# Patient Record
Sex: MALE | Race: WHITE
[De-identification: names, ages, dates, MRNs, and addresses within clinical notes are randomized per-mention and may not be internally consistent; named-entity substitution may affect disease eponyms.]

---

## 2021-04-11 ENCOUNTER — HOSPITAL ENCOUNTER (EMERGENCY)
Dept: HOSPITAL 96 - M.ERS | Age: 21
Discharge: HOME | End: 2021-04-11
Payer: COMMERCIAL

## 2021-04-11 VITALS — SYSTOLIC BLOOD PRESSURE: 136 MMHG | DIASTOLIC BLOOD PRESSURE: 82 MMHG

## 2021-04-11 VITALS — HEIGHT: 73 IN | WEIGHT: 180.01 LBS | BODY MASS INDEX: 23.86 KG/M2

## 2021-04-11 DIAGNOSIS — F12.90: Primary | ICD-10-CM

## 2021-04-11 DIAGNOSIS — R11.2: ICD-10-CM

## 2021-04-11 LAB
ABSOLUTE BASOPHILS: 0 THOU/UL (ref 0–0.2)
ABSOLUTE EOSINOPHILS: 0.1 THOU/UL (ref 0–0.7)
ABSOLUTE MONOCYTES: 0.9 THOU/UL (ref 0–1.2)
ALBUMIN SERPL-MCNC: 4.4 G/DL (ref 3.4–5)
ALP SERPL-CCNC: 66 U/L (ref 46–116)
ALT SERPL-CCNC: 21 U/L (ref 30–65)
ANION GAP SERPL CALC-SCNC: 15 MMOL/L (ref 7–16)
AST SERPL-CCNC: 15 U/L (ref 15–37)
BASOPHILS NFR BLD AUTO: 0.4 %
BILIRUB SERPL-MCNC: 0.3 MG/DL
BILIRUB UR-MCNC: NEGATIVE MG/DL
BUN SERPL-MCNC: 16 MG/DL (ref 7–18)
CALCIUM SERPL-MCNC: 9.5 MG/DL (ref 8.5–10.1)
CHLORIDE SERPL-SCNC: 104 MMOL/L (ref 98–107)
CO2 SERPL-SCNC: 22 MMOL/L (ref 21–32)
COLOR UR: YELLOW
CREAT SERPL-MCNC: 1.2 MG/DL (ref 0.6–1.3)
EOSINOPHIL NFR BLD: 0.5 %
GLUCOSE SERPL-MCNC: 153 MG/DL (ref 70–99)
GRANULOCYTES NFR BLD MANUAL: 77 %
HCT VFR BLD CALC: 40.8 % (ref 42–52)
HGB BLD-MCNC: 13.8 GM/DL (ref 14–18)
KETONES UR STRIP-MCNC: NEGATIVE MG/DL
LIPASE: 222 U/L (ref 73–393)
LYMPHOCYTES # BLD: 1.8 THOU/UL (ref 0.8–5.3)
LYMPHOCYTES NFR BLD AUTO: 14.6 %
MCH RBC QN AUTO: 28 PG (ref 26–34)
MCHC RBC AUTO-ENTMCNC: 33.9 G/DL (ref 28–37)
MCV RBC: 82.6 FL (ref 80–100)
MONOCYTES NFR BLD: 7.5 %
MPV: 7.4 FL. (ref 7.2–11.1)
NEUTROPHILS # BLD: 9.2 THOU/UL (ref 1.6–8.1)
NITRITE UR QL STRIP: NEGATIVE
NUCLEATED RBCS: 0 /100WBC
PLATELET COUNT*: 321 THOU/UL (ref 150–400)
POTASSIUM SERPL-SCNC: 3.1 MMOL/L (ref 3.5–5.1)
PROT SERPL-MCNC: 7.5 G/DL (ref 6.4–8.2)
PROT UR QL STRIP: NEGATIVE
RBC # BLD AUTO: 4.94 MIL/UL (ref 4.5–6)
RBC # UR STRIP: NEGATIVE /UL
RDW-CV: 12.2 % (ref 10.5–14.5)
SODIUM SERPL-SCNC: 141 MMOL/L (ref 136–145)
SP GR UR STRIP: 1.02 (ref 1–1.03)
URINE CLARITY: CLEAR
URINE GLUCOSE-RANDOM: NEGATIVE
URINE LEUKOCYTES: NEGATIVE
UROBILINOGEN UR STRIP-ACNC: 0.2 E.U./DL (ref 0.2–1)
WBC # BLD AUTO: 12 THOU/UL (ref 4–11)

## 2021-04-12 NOTE — EKG
[place] [address]
Phone:  [phone]                     ELECTROCARDIOGRAM REPORT      
_______________________________________________________________________________
 
Name:         JULIET RAYMUNDO                 Room:                     [facility]#:    [id number]     Account #:     [id number]  
Admission:    21    Attend Phys:                     
Discharge:    21    Date of Birth: [birth year]  
Date of Service: 21 1224  Report #:      [id number]
        [id number]
_______________________________________________________________________________
THIS REPORT FOR:  //name//                      
 
                         [facility] ED
                                       
Test Date:    2021               Test Time:    12:24:40
Pat Name:     JULIET RAYMUNDO            Department:   
Patient ID:   [id number]            Room:          
Gender:                               Technician:   
:          2000               Requested By: Tobi Morris
Order Number: [id number]    Daniella MD:   Jon Zhang
                                 Measurements
Intervals                              Axis          
Rate:         87                       P:            80
MS:           139                      QRS:          87
QRSD:         104                      T:            44
QT:           372                                    
QTc:          448                                    
                           Interpretive Statements
Sinus rhythm
ST elev, probable normal early repol pattern
No previous ECG available for comparison
Electronically Signed On 2021 11:20:44 CDT by Jon Zhang
[email or web address]
 
 
 
 
 
 
 
 
 
 
 
 
 
 
 
 
 
 
 
 
 
  <ELECTRONICALLY SIGNED>
                                           By: Jon Zhang MD, [facility]      
  21     1120
D: 21 1224   _____________________________________
T: 21 1224   Jon Zhang MD, [facility]        /EPI